# Patient Record
Sex: FEMALE | Race: WHITE | Employment: UNEMPLOYED | URBAN - METROPOLITAN AREA
[De-identification: names, ages, dates, MRNs, and addresses within clinical notes are randomized per-mention and may not be internally consistent; named-entity substitution may affect disease eponyms.]

---

## 2019-03-30 PROBLEM — S82.61XA CLOSED FRACTURE OF DISTAL LATERAL MALLEOLUS OF RIGHT ANKLE: Status: ACTIVE | Noted: 2019-03-30

## 2019-05-23 PROBLEM — R53.1 WEAKNESS: Status: ACTIVE | Noted: 2019-05-23

## 2023-05-03 ENCOUNTER — HOSPITAL ENCOUNTER (EMERGENCY)
Age: 48
Discharge: HOME | End: 2023-05-03
Attending: EMERGENCY MEDICINE
Payer: COMMERCIAL

## 2023-05-03 VITALS
WEIGHT: 239.42 LBS | DIASTOLIC BLOOD PRESSURE: 88 MMHG | TEMPERATURE: 97.7 F | HEART RATE: 72 BPM | SYSTOLIC BLOOD PRESSURE: 131 MMHG | RESPIRATION RATE: 15 BRPM | HEIGHT: 63 IN | BODY MASS INDEX: 42.42 KG/M2 | OXYGEN SATURATION: 99 %

## 2023-05-03 DIAGNOSIS — F43.21 SITUATIONAL DEPRESSION: ICD-10-CM

## 2023-05-03 DIAGNOSIS — E83.42 HYPOMAGNESEMIA: ICD-10-CM

## 2023-05-03 DIAGNOSIS — E87.20 METABOLIC ACIDOSIS: ICD-10-CM

## 2023-05-03 DIAGNOSIS — R79.89 ELEVATED LACTIC ACID LEVEL: ICD-10-CM

## 2023-05-03 DIAGNOSIS — F10.929 ACUTE ALCOHOLIC INTOXICATION WITH COMPLICATION (HCC): Primary | ICD-10-CM

## 2023-05-03 DIAGNOSIS — E87.6 HYPOKALEMIA: ICD-10-CM

## 2023-05-03 LAB
ALBUMIN SERPL-MCNC: 3.8 G/DL (ref 3.4–5)
ALBUMIN/GLOB SERPL: 1.5 {RATIO} (ref 1.1–2.2)
ALP SERPL-CCNC: 64 U/L (ref 40–129)
ALT SERPL-CCNC: 28 U/L (ref 10–40)
ANION GAP SERPL CALCULATED.3IONS-SCNC: 15 MMOL/L (ref 3–16)
AST SERPL-CCNC: 35 U/L (ref 15–37)
BASOPHILS # BLD: 0.3 K/UL (ref 0–0.2)
BASOPHILS NFR BLD: 2.6 %
BILIRUB SERPL-MCNC: <0.2 MG/DL (ref 0–1)
BUN SERPL-MCNC: 4 MG/DL (ref 7–20)
CALCIUM SERPL-MCNC: 7.8 MG/DL (ref 8.3–10.6)
CHLORIDE SERPL-SCNC: 108 MMOL/L (ref 99–110)
CO2 SERPL-SCNC: 17 MMOL/L (ref 21–32)
CREAT SERPL-MCNC: <0.5 MG/DL (ref 0.6–1.1)
DEPRECATED RDW RBC AUTO: 13.8 % (ref 12.4–15.4)
EKG ATRIAL RATE: 82 BPM
EKG DIAGNOSIS: NORMAL
EKG P AXIS: 17 DEGREES
EKG P-R INTERVAL: 188 MS
EKG Q-T INTERVAL: 390 MS
EKG QRS DURATION: 70 MS
EKG QTC CALCULATION (BAZETT): 455 MS
EKG R AXIS: 43 DEGREES
EKG T AXIS: 42 DEGREES
EKG VENTRICULAR RATE: 82 BPM
EOSINOPHIL # BLD: 0.1 K/UL (ref 0–0.6)
EOSINOPHIL NFR BLD: 0.8 %
ETHANOLAMINE SERPL-MCNC: 210 MG/DL (ref 0–0.08)
GFR SERPLBLD CREATININE-BSD FMLA CKD-EPI: >60 ML/MIN/{1.73_M2}
GLUCOSE SERPL-MCNC: 100 MG/DL (ref 70–99)
HCG SERPL QL: NEGATIVE
HCT VFR BLD AUTO: 46.4 % (ref 36–48)
HGB BLD-MCNC: 15.6 G/DL (ref 12–16)
LACTATE BLDV-SCNC: 2.7 MMOL/L (ref 0.4–2)
LACTATE BLDV-SCNC: 3.9 MMOL/L (ref 0.4–2)
LIPASE SERPL-CCNC: 42 U/L (ref 13–60)
LYMPHOCYTES # BLD: 3.2 K/UL (ref 1–5.1)
LYMPHOCYTES NFR BLD: 28.6 %
MAGNESIUM SERPL-MCNC: 1.7 MG/DL (ref 1.8–2.4)
MCH RBC QN AUTO: 30.4 PG (ref 26–34)
MCHC RBC AUTO-ENTMCNC: 33.6 G/DL (ref 31–36)
MCV RBC AUTO: 90.6 FL (ref 80–100)
MONOCYTES # BLD: 0.9 K/UL (ref 0–1.3)
MONOCYTES NFR BLD: 7.6 %
NEUTROPHILS # BLD: 6.8 K/UL (ref 1.7–7.7)
NEUTROPHILS NFR BLD: 60.4 %
PLATELET # BLD AUTO: 171 K/UL (ref 135–450)
PMV BLD AUTO: 11.5 FL (ref 5–10.5)
POTASSIUM SERPL-SCNC: 3.3 MMOL/L (ref 3.5–5.1)
PROT SERPL-MCNC: 6.4 G/DL (ref 6.4–8.2)
RBC # BLD AUTO: 5.12 M/UL (ref 4–5.2)
SODIUM SERPL-SCNC: 140 MMOL/L (ref 136–145)
TROPONIN, HIGH SENSITIVITY: <6 NG/L (ref 0–14)
WBC # BLD AUTO: 11.3 K/UL (ref 4–11)

## 2023-05-03 PROCEDURE — 96375 TX/PRO/DX INJ NEW DRUG ADDON: CPT

## 2023-05-03 PROCEDURE — 93010 ELECTROCARDIOGRAM REPORT: CPT | Performed by: INTERNAL MEDICINE

## 2023-05-03 PROCEDURE — 96365 THER/PROPH/DIAG IV INF INIT: CPT

## 2023-05-03 PROCEDURE — 6360000002 HC RX W HCPCS: Performed by: EMERGENCY MEDICINE

## 2023-05-03 PROCEDURE — 85025 COMPLETE CBC W/AUTO DIFF WBC: CPT

## 2023-05-03 PROCEDURE — 83735 ASSAY OF MAGNESIUM: CPT

## 2023-05-03 PROCEDURE — 99284 EMERGENCY DEPT VISIT MOD MDM: CPT

## 2023-05-03 PROCEDURE — 36415 COLL VENOUS BLD VENIPUNCTURE: CPT

## 2023-05-03 PROCEDURE — 84484 ASSAY OF TROPONIN QUANT: CPT

## 2023-05-03 PROCEDURE — 83690 ASSAY OF LIPASE: CPT

## 2023-05-03 PROCEDURE — 96361 HYDRATE IV INFUSION ADD-ON: CPT

## 2023-05-03 PROCEDURE — 84703 CHORIONIC GONADOTROPIN ASSAY: CPT

## 2023-05-03 PROCEDURE — 2580000003 HC RX 258: Performed by: EMERGENCY MEDICINE

## 2023-05-03 PROCEDURE — 93005 ELECTROCARDIOGRAM TRACING: CPT | Performed by: EMERGENCY MEDICINE

## 2023-05-03 PROCEDURE — 80053 COMPREHEN METABOLIC PANEL: CPT

## 2023-05-03 PROCEDURE — 83605 ASSAY OF LACTIC ACID: CPT

## 2023-05-03 PROCEDURE — 82077 ASSAY SPEC XCP UR&BREATH IA: CPT

## 2023-05-03 RX ORDER — 0.9 % SODIUM CHLORIDE 0.9 %
2000 INTRAVENOUS SOLUTION INTRAVENOUS ONCE
Status: COMPLETED | OUTPATIENT
Start: 2023-05-03 | End: 2023-05-03

## 2023-05-03 RX ORDER — POTASSIUM CHLORIDE 750 MG/1
10 TABLET, EXTENDED RELEASE ORAL DAILY
Qty: 10 TABLET | Refills: 0 | Status: SHIPPED | OUTPATIENT
Start: 2023-05-03 | End: 2023-05-13

## 2023-05-03 RX ORDER — ONDANSETRON 2 MG/ML
8 INJECTION INTRAMUSCULAR; INTRAVENOUS ONCE
Status: COMPLETED | OUTPATIENT
Start: 2023-05-03 | End: 2023-05-03

## 2023-05-03 RX ORDER — MAGNESIUM SULFATE 1 G/100ML
1000 INJECTION INTRAVENOUS ONCE
Status: COMPLETED | OUTPATIENT
Start: 2023-05-03 | End: 2023-05-03

## 2023-05-03 RX ORDER — ONDANSETRON 8 MG/1
8 TABLET, ORALLY DISINTEGRATING ORAL EVERY 8 HOURS PRN
Qty: 12 TABLET | Refills: 0 | Status: SHIPPED | OUTPATIENT
Start: 2023-05-03 | End: 2023-05-07

## 2023-05-03 RX ORDER — POTASSIUM CHLORIDE 7.45 MG/ML
10 INJECTION INTRAVENOUS PRN
Status: DISCONTINUED | OUTPATIENT
Start: 2023-05-03 | End: 2023-05-03 | Stop reason: HOSPADM

## 2023-05-03 RX ORDER — BACLOFEN 20 MG
500 TABLET ORAL 2 TIMES DAILY
Qty: 20 TABLET | Refills: 0 | Status: SHIPPED | OUTPATIENT
Start: 2023-05-03 | End: 2023-05-13

## 2023-05-03 RX ADMIN — SODIUM CHLORIDE 2000 ML: 9 INJECTION, SOLUTION INTRAVENOUS at 05:13

## 2023-05-03 RX ADMIN — ONDANSETRON 8 MG: 2 INJECTION INTRAMUSCULAR; INTRAVENOUS at 05:11

## 2023-05-03 RX ADMIN — POTASSIUM CHLORIDE 10 MEQ: 7.46 INJECTION, SOLUTION INTRAVENOUS at 07:12

## 2023-05-03 RX ADMIN — MAGNESIUM SULFATE HEPTAHYDRATE 1000 MG: 1 INJECTION, SOLUTION INTRAVENOUS at 06:38

## 2023-05-03 ASSESSMENT — PAIN - FUNCTIONAL ASSESSMENT
PAIN_FUNCTIONAL_ASSESSMENT: NONE - DENIES PAIN

## 2023-05-03 NOTE — DISCHARGE INSTRUCTIONS
Please continue your present treatments and medications until you receive further instructions from your healthcare professional. Take all your medications as prescribed unless a healthcare professional instructs you to do otherwise. Stop drinking alcohol. It appears to be dangerous for you.

## 2023-05-03 NOTE — ED PROVIDER NOTES
90 tablet 2    metoprolol succinate (TOPROL XL) 100 MG extended release tablet TAKE ONE TABLET BY MOUTH DAILY 90 tablet 1    TIZANIDINE HCL PO Take 1 tablet by mouth as needed      albuterol sulfate  (90 Base) MCG/ACT inhaler Inhale 2 puffs into the lungs 4 times daily as needed for Wheezing 18 g 0    cetirizine (ZYRTEC ALLERGY) 10 MG tablet Take 1 tablet by mouth daily (Patient taking differently: Take 10 mg by mouth as needed ) 10 tablet 0    diclofenac sodium (VOLTAREN) 1 % GEL Apply 4 g topically 4 times daily 350 g 2    vitamin D (ERGOCALCIFEROL) 1.25 MG (88358 UT) CAPS capsule Take 1 capsule by mouth once a week 4 capsule 5    traZODone (DESYREL) 50 MG tablet TAKE ONE TABLET BY MOUTH DAILY 30 tablet 2    albuterol sulfate HFA (VENTOLIN HFA) 108 (90 Base) MCG/ACT inhaler Inhale 2 puffs into the lungs 4 times daily as needed for Wheezing 1 Inhaler 5    oxyCODONE-acetaminophen (PERCOCET) 5-325 MG per tablet Take 1 tablet by mouth 2 times daily as needed. ALLERGIES  Allergies   Allergen Reactions    Latex     Augmentin [Amoxicillin-Pot Clavulanate] Shortness Of Breath and Swelling    Cefzil [Cefprozil] Rash    Vancomycin Swelling and Palpitations       Tetanus vaccination status reviewed: tetanus re-vaccination not indicated. PHYSICAL EXAM  VITAL SIGNS: BP (!) 129/95   Pulse 88   Temp 97.7 °F (36.5 °C) (Oral)   Resp 22   Ht 5' 2.99\" (1.6 m)   Wt 239 lb 6.7 oz (108.6 kg)   LMP 05/03/2023   SpO2 100%   BMI 42.42 kg/m²   Constitutional: Well-developed, well-nourished, appears intoxicated, awake, vomiting, nontoxic, activity: Appears upset and vomiting. She does not hold her emesis bag herself. She has the nurse doing everything for her this includes wiping her mouth.   HENT: Normocephalic, Atraumatic, Bilateral external ears normal, TM's were normal, Mucus membranes are moist and oropharynx is patent and clear, No oral exudates, Nose normal, No lingual abrasions, no lingual lacerations, no

## 2023-05-03 NOTE — ED TRIAGE NOTES
Pt was brought in by squad with c/o alcohol intoxication. Pt denies pain, pt alert and oriented. Pt c/o being cold. Pt clothes ere wet, had vomit, urine and stool. Clothes removed and warm gown and warm blanket applied. Pt c/o nausea.

## 2023-06-23 ENCOUNTER — HOSPITAL ENCOUNTER (OUTPATIENT)
Dept: MRI IMAGING | Age: 48
Discharge: HOME | End: 2023-06-23
Payer: COMMERCIAL

## 2023-06-23 ENCOUNTER — HOSPITAL ENCOUNTER (OUTPATIENT)
Dept: GENERAL RADIOLOGY | Age: 48
Discharge: HOME | End: 2023-06-23
Payer: COMMERCIAL

## 2023-06-23 ENCOUNTER — HOSPITAL ENCOUNTER (OUTPATIENT)
Age: 48
Discharge: HOME | End: 2023-06-23
Payer: COMMERCIAL

## 2023-06-23 DIAGNOSIS — M46.1 DEGENERATIVE JOINT DISEASE OF SACROILIAC REGION (HCC): ICD-10-CM

## 2023-06-23 PROCEDURE — 73560 X-RAY EXAM OF KNEE 1 OR 2: CPT

## 2023-06-23 PROCEDURE — 72157 MRI CHEST SPINE W/O & W/DYE: CPT

## 2023-06-23 PROCEDURE — A9577 INJ MULTIHANCE: HCPCS | Performed by: PHYSICAL MEDICINE & REHABILITATION

## 2023-06-23 PROCEDURE — 72158 MRI LUMBAR SPINE W/O & W/DYE: CPT

## 2023-06-23 PROCEDURE — 6360000004 HC RX CONTRAST MEDICATION: Performed by: PHYSICAL MEDICINE & REHABILITATION

## 2023-06-23 RX ADMIN — GADOBENATE DIMEGLUMINE 20 ML: 529 INJECTION, SOLUTION INTRAVENOUS at 11:00

## 2024-02-12 ENCOUNTER — HOSPITAL ENCOUNTER (OUTPATIENT)
Dept: ULTRASOUND IMAGING | Age: 49
Discharge: HOME | End: 2024-02-12
Attending: INTERNAL MEDICINE
Payer: COMMERCIAL

## 2024-02-12 ENCOUNTER — HOSPITAL ENCOUNTER (OUTPATIENT)
Age: 49
Discharge: HOME | End: 2024-02-12
Attending: INTERNAL MEDICINE
Payer: COMMERCIAL

## 2024-02-12 ENCOUNTER — HOSPITAL ENCOUNTER (OUTPATIENT)
Dept: GENERAL RADIOLOGY | Age: 49
Discharge: HOME | End: 2024-02-12
Attending: INTERNAL MEDICINE
Payer: COMMERCIAL

## 2024-02-12 DIAGNOSIS — I10 HYPERTENSION, UNSPECIFIED TYPE: ICD-10-CM

## 2024-02-12 DIAGNOSIS — M15.9 OSTEOARTHRITIS OF MULTIPLE JOINTS, UNSPECIFIED OSTEOARTHRITIS TYPE: ICD-10-CM

## 2024-02-12 DIAGNOSIS — R10.30 LOWER ABDOMINAL PAIN: ICD-10-CM

## 2024-02-12 LAB
25(OH)D3 SERPL-MCNC: 16.6 NG/ML
ALBUMIN SERPL-MCNC: 3.6 G/DL (ref 3.4–5)
ALBUMIN/GLOB SERPL: 1.5 {RATIO} (ref 1.1–2.2)
ALP SERPL-CCNC: 65 U/L (ref 40–129)
ALT SERPL-CCNC: 11 U/L (ref 10–40)
ANION GAP SERPL CALCULATED.3IONS-SCNC: 11 MMOL/L (ref 3–16)
AST SERPL-CCNC: 15 U/L (ref 15–37)
BASOPHILS # BLD: 0.2 K/UL (ref 0–0.2)
BASOPHILS NFR BLD: 1.6 %
BILIRUB SERPL-MCNC: 0.3 MG/DL (ref 0–1)
BUN SERPL-MCNC: 8 MG/DL (ref 7–20)
CALCIUM SERPL-MCNC: 8.6 MG/DL (ref 8.3–10.6)
CHLORIDE SERPL-SCNC: 104 MMOL/L (ref 99–110)
CHOLEST SERPL-MCNC: 162 MG/DL (ref 0–199)
CO2 SERPL-SCNC: 23 MMOL/L (ref 21–32)
CREAT SERPL-MCNC: 0.7 MG/DL (ref 0.6–1.1)
DEPRECATED RDW RBC AUTO: 14 % (ref 12.4–15.4)
EOSINOPHIL # BLD: 0.3 K/UL (ref 0–0.6)
EOSINOPHIL NFR BLD: 2.7 %
ERYTHROCYTE [SEDIMENTATION RATE] IN BLOOD BY WESTERGREN METHOD: 7 MM/HR (ref 0–20)
EST. AVERAGE GLUCOSE BLD GHB EST-MCNC: 99.7 MG/DL
FOLATE SERPL-MCNC: 2.93 NG/ML (ref 4.78–24.2)
GFR SERPLBLD CREATININE-BSD FMLA CKD-EPI: >60 ML/MIN/{1.73_M2}
GLUCOSE SERPL-MCNC: 93 MG/DL (ref 70–99)
HBA1C MFR BLD: 5.1 %
HCT VFR BLD AUTO: 44 % (ref 36–48)
HDLC SERPL-MCNC: 48 MG/DL (ref 40–60)
HGB BLD-MCNC: 15 G/DL (ref 12–16)
LDLC SERPL CALC-MCNC: 88 MG/DL
LYMPHOCYTES # BLD: 2.9 K/UL (ref 1–5.1)
LYMPHOCYTES NFR BLD: 28.4 %
MCH RBC QN AUTO: 29.7 PG (ref 26–34)
MCHC RBC AUTO-ENTMCNC: 34.1 G/DL (ref 31–36)
MCV RBC AUTO: 86.9 FL (ref 80–100)
MONOCYTES # BLD: 0.6 K/UL (ref 0–1.3)
MONOCYTES NFR BLD: 6.2 %
NEUTROPHILS # BLD: 6.2 K/UL (ref 1.7–7.7)
NEUTROPHILS NFR BLD: 61.1 %
PLATELET # BLD AUTO: 160 K/UL (ref 135–450)
PLATELET BLD QL SMEAR: ADEQUATE
PMV BLD AUTO: 12.4 FL (ref 5–10.5)
POTASSIUM SERPL-SCNC: 3.3 MMOL/L (ref 3.5–5.1)
PROT SERPL-MCNC: 6 G/DL (ref 6.4–8.2)
RBC # BLD AUTO: 5.06 M/UL (ref 4–5.2)
SLIDE REVIEW: ABNORMAL
SODIUM SERPL-SCNC: 138 MMOL/L (ref 136–145)
TRIGL SERPL-MCNC: 131 MG/DL (ref 0–150)
TSH SERPL DL<=0.005 MIU/L-ACNC: 2.27 UIU/ML (ref 0.27–4.2)
VIT B12 SERPL-MCNC: 434 PG/ML (ref 211–911)
VLDLC SERPL CALC-MCNC: 26 MG/DL
WBC # BLD AUTO: 10.1 K/UL (ref 4–11)

## 2024-02-12 PROCEDURE — 80061 LIPID PANEL: CPT

## 2024-02-12 PROCEDURE — 36415 COLL VENOUS BLD VENIPUNCTURE: CPT

## 2024-02-12 PROCEDURE — 85652 RBC SED RATE AUTOMATED: CPT

## 2024-02-12 PROCEDURE — 72050 X-RAY EXAM NECK SPINE 4/5VWS: CPT

## 2024-02-12 PROCEDURE — 72040 X-RAY EXAM NECK SPINE 2-3 VW: CPT

## 2024-02-12 PROCEDURE — 76705 ECHO EXAM OF ABDOMEN: CPT

## 2024-02-12 PROCEDURE — 82306 VITAMIN D 25 HYDROXY: CPT

## 2024-02-12 PROCEDURE — 83036 HEMOGLOBIN GLYCOSYLATED A1C: CPT

## 2024-02-12 PROCEDURE — 82607 VITAMIN B-12: CPT

## 2024-02-12 PROCEDURE — 80053 COMPREHEN METABOLIC PANEL: CPT

## 2024-02-12 PROCEDURE — 82746 ASSAY OF FOLIC ACID SERUM: CPT

## 2024-02-12 PROCEDURE — 73030 X-RAY EXAM OF SHOULDER: CPT

## 2024-02-12 PROCEDURE — 84443 ASSAY THYROID STIM HORMONE: CPT

## 2024-02-12 PROCEDURE — 85025 COMPLETE CBC W/AUTO DIFF WBC: CPT

## 2024-06-26 ENCOUNTER — HOSPITAL ENCOUNTER (INPATIENT)
Age: 49
LOS: 1 days | Discharge: HOME | DRG: 313 | End: 2024-06-27
Attending: EMERGENCY MEDICINE | Admitting: INTERNAL MEDICINE
Payer: MEDICAID

## 2024-06-26 ENCOUNTER — APPOINTMENT (OUTPATIENT)
Dept: GENERAL RADIOLOGY | Age: 49
DRG: 313 | End: 2024-06-26
Payer: MEDICAID

## 2024-06-26 DIAGNOSIS — R07.9 CHEST PAIN, UNSPECIFIED TYPE: ICD-10-CM

## 2024-06-26 DIAGNOSIS — R07.9 ACUTE CHEST PAIN: Primary | ICD-10-CM

## 2024-06-26 LAB
ALBUMIN SERPL-MCNC: 3.7 G/DL (ref 3.4–5)
ALBUMIN/GLOB SERPL: 1.7 {RATIO} (ref 1.1–2.2)
ALP SERPL-CCNC: 67 U/L (ref 40–129)
ALT SERPL-CCNC: 13 U/L (ref 10–40)
ANION GAP SERPL CALCULATED.3IONS-SCNC: 11 MMOL/L (ref 3–16)
AST SERPL-CCNC: 18 U/L (ref 15–37)
BASOPHILS # BLD: 0.2 K/UL (ref 0–0.2)
BASOPHILS NFR BLD: 1.5 %
BILIRUB SERPL-MCNC: 0.4 MG/DL (ref 0–1)
BUN SERPL-MCNC: 6 MG/DL (ref 7–20)
CALCIUM SERPL-MCNC: 8.7 MG/DL (ref 8.3–10.6)
CHLORIDE SERPL-SCNC: 96 MMOL/L (ref 99–110)
CO2 SERPL-SCNC: 29 MMOL/L (ref 21–32)
CREAT SERPL-MCNC: 0.9 MG/DL (ref 0.6–1.1)
DEPRECATED RDW RBC AUTO: 14.5 % (ref 12.4–15.4)
EOSINOPHIL # BLD: 0.3 K/UL (ref 0–0.6)
EOSINOPHIL NFR BLD: 2.5 %
GFR SERPLBLD CREATININE-BSD FMLA CKD-EPI: 78 ML/MIN/{1.73_M2}
GLUCOSE BLD-MCNC: 113 MG/DL (ref 70–99)
GLUCOSE SERPL-MCNC: 87 MG/DL (ref 70–99)
HCG SERPL QL: NEGATIVE
HCT VFR BLD AUTO: 46.3 % (ref 36–48)
HGB BLD-MCNC: 15.9 G/DL (ref 12–16)
LIPASE SERPL-CCNC: 63 U/L (ref 13–60)
LYMPHOCYTES # BLD: 3.9 K/UL (ref 1–5.1)
LYMPHOCYTES NFR BLD: 32.7 %
MAGNESIUM SERPL-MCNC: 1.8 MG/DL (ref 1.8–2.4)
MCH RBC QN AUTO: 30.7 PG (ref 26–34)
MCHC RBC AUTO-ENTMCNC: 34.5 G/DL (ref 31–36)
MCV RBC AUTO: 89.1 FL (ref 80–100)
MONOCYTES # BLD: 0.6 K/UL (ref 0–1.3)
MONOCYTES NFR BLD: 5.5 %
NEUTROPHILS # BLD: 6.8 K/UL (ref 1.7–7.7)
NEUTROPHILS NFR BLD: 57.8 %
PERFORMED ON: ABNORMAL
PLATELET # BLD AUTO: 168 K/UL (ref 135–450)
PMV BLD AUTO: 10.8 FL (ref 5–10.5)
POTASSIUM SERPL-SCNC: 3.2 MMOL/L (ref 3.5–5.1)
PROT SERPL-MCNC: 5.9 G/DL (ref 6.4–8.2)
RBC # BLD AUTO: 5.19 M/UL (ref 4–5.2)
SODIUM SERPL-SCNC: 136 MMOL/L (ref 136–145)
TROPONIN, HIGH SENSITIVITY: 7 NG/L (ref 0–14)
TROPONIN, HIGH SENSITIVITY: 9 NG/L (ref 0–14)
TROPONIN, HIGH SENSITIVITY: <6 NG/L (ref 0–14)
TROPONIN, HIGH SENSITIVITY: <6 NG/L (ref 0–14)
WBC # BLD AUTO: 11.8 K/UL (ref 4–11)

## 2024-06-26 PROCEDURE — 36415 COLL VENOUS BLD VENIPUNCTURE: CPT

## 2024-06-26 PROCEDURE — 83735 ASSAY OF MAGNESIUM: CPT

## 2024-06-26 PROCEDURE — 84484 ASSAY OF TROPONIN QUANT: CPT

## 2024-06-26 PROCEDURE — 83690 ASSAY OF LIPASE: CPT

## 2024-06-26 PROCEDURE — 99285 EMERGENCY DEPT VISIT HI MDM: CPT

## 2024-06-26 PROCEDURE — 85025 COMPLETE CBC W/AUTO DIFF WBC: CPT

## 2024-06-26 PROCEDURE — 1200000000 HC SEMI PRIVATE

## 2024-06-26 PROCEDURE — 93005 ELECTROCARDIOGRAM TRACING: CPT | Performed by: EMERGENCY MEDICINE

## 2024-06-26 PROCEDURE — 80053 COMPREHEN METABOLIC PANEL: CPT

## 2024-06-26 PROCEDURE — 6370000000 HC RX 637 (ALT 250 FOR IP): Performed by: EMERGENCY MEDICINE

## 2024-06-26 PROCEDURE — 84703 CHORIONIC GONADOTROPIN ASSAY: CPT

## 2024-06-26 PROCEDURE — 6370000000 HC RX 637 (ALT 250 FOR IP): Performed by: INTERNAL MEDICINE

## 2024-06-26 PROCEDURE — 71046 X-RAY EXAM CHEST 2 VIEWS: CPT

## 2024-06-26 RX ORDER — DULOXETIN HYDROCHLORIDE 60 MG/1
60 CAPSULE, DELAYED RELEASE ORAL DAILY
Status: DISCONTINUED | OUTPATIENT
Start: 2024-06-27 | End: 2024-06-27 | Stop reason: HOSPADM

## 2024-06-26 RX ORDER — GLUCAGON 1 MG/ML
1 KIT INJECTION PRN
Status: DISCONTINUED | OUTPATIENT
Start: 2024-06-26 | End: 2024-06-27 | Stop reason: HOSPADM

## 2024-06-26 RX ORDER — FUROSEMIDE 20 MG/1
20 TABLET ORAL 2 TIMES DAILY
Status: DISCONTINUED | OUTPATIENT
Start: 2024-06-27 | End: 2024-06-27 | Stop reason: HOSPADM

## 2024-06-26 RX ORDER — INSULIN LISPRO 100 [IU]/ML
0-4 INJECTION, SOLUTION INTRAVENOUS; SUBCUTANEOUS NIGHTLY
Status: DISCONTINUED | OUTPATIENT
Start: 2024-06-26 | End: 2024-06-27 | Stop reason: HOSPADM

## 2024-06-26 RX ORDER — NICOTINE 21 MG/24HR
1 PATCH, TRANSDERMAL 24 HOURS TRANSDERMAL DAILY
Status: DISCONTINUED | OUTPATIENT
Start: 2024-06-27 | End: 2024-06-27 | Stop reason: HOSPADM

## 2024-06-26 RX ORDER — DEXTROSE MONOHYDRATE 100 MG/ML
INJECTION, SOLUTION INTRAVENOUS CONTINUOUS PRN
Status: DISCONTINUED | OUTPATIENT
Start: 2024-06-26 | End: 2024-06-27 | Stop reason: HOSPADM

## 2024-06-26 RX ORDER — INSULIN LISPRO 100 [IU]/ML
0-4 INJECTION, SOLUTION INTRAVENOUS; SUBCUTANEOUS
Status: DISCONTINUED | OUTPATIENT
Start: 2024-06-27 | End: 2024-06-27 | Stop reason: HOSPADM

## 2024-06-26 RX ORDER — OXYCODONE HYDROCHLORIDE AND ACETAMINOPHEN 5; 325 MG/1; MG/1
1 TABLET ORAL 2 TIMES DAILY PRN
Status: DISCONTINUED | OUTPATIENT
Start: 2024-06-26 | End: 2024-06-27 | Stop reason: HOSPADM

## 2024-06-26 RX ORDER — NIFEDIPINE 60 MG/1
60 TABLET, EXTENDED RELEASE ORAL DAILY
Status: DISCONTINUED | OUTPATIENT
Start: 2024-06-27 | End: 2024-06-27 | Stop reason: HOSPADM

## 2024-06-26 RX ORDER — NITROGLYCERIN 0.4 MG/1
0.4 TABLET SUBLINGUAL EVERY 5 MIN PRN
Status: DISCONTINUED | OUTPATIENT
Start: 2024-06-26 | End: 2024-06-27 | Stop reason: HOSPADM

## 2024-06-26 RX ORDER — ASPIRIN 81 MG/1
324 TABLET, CHEWABLE ORAL ONCE
Status: COMPLETED | OUTPATIENT
Start: 2024-06-26 | End: 2024-06-26

## 2024-06-26 RX ORDER — GABAPENTIN 100 MG/1
400 CAPSULE ORAL 3 TIMES DAILY
Status: ON HOLD | COMMUNITY
End: 2024-06-27

## 2024-06-26 RX ORDER — METOPROLOL SUCCINATE 50 MG/1
100 TABLET, EXTENDED RELEASE ORAL DAILY
Status: DISCONTINUED | OUTPATIENT
Start: 2024-06-27 | End: 2024-06-27 | Stop reason: HOSPADM

## 2024-06-26 RX ORDER — PANTOPRAZOLE SODIUM 40 MG/1
40 TABLET, DELAYED RELEASE ORAL
Status: DISCONTINUED | OUTPATIENT
Start: 2024-06-27 | End: 2024-06-27 | Stop reason: HOSPADM

## 2024-06-26 RX ORDER — ARIPIPRAZOLE 5 MG/1
5 TABLET ORAL DAILY
Status: DISCONTINUED | OUTPATIENT
Start: 2024-06-27 | End: 2024-06-27 | Stop reason: HOSPADM

## 2024-06-26 RX ORDER — ALBUTEROL SULFATE 90 UG/1
2 AEROSOL, METERED RESPIRATORY (INHALATION) 4 TIMES DAILY PRN
Status: DISCONTINUED | OUTPATIENT
Start: 2024-06-26 | End: 2024-06-27 | Stop reason: HOSPADM

## 2024-06-26 RX ADMIN — ASPIRIN 324 MG: 81 TABLET, CHEWABLE ORAL at 13:54

## 2024-06-26 RX ADMIN — NITROGLYCERIN 0.4 MG: 0.4 TABLET, ORALLY DISINTEGRATING SUBLINGUAL at 13:55

## 2024-06-26 RX ADMIN — NITROGLYCERIN 0.4 MG: 0.4 TABLET, ORALLY DISINTEGRATING SUBLINGUAL at 14:04

## 2024-06-26 RX ADMIN — OXYCODONE HYDROCHLORIDE AND ACETAMINOPHEN 1 TABLET: 5; 325 TABLET ORAL at 19:25

## 2024-06-26 ASSESSMENT — PAIN - FUNCTIONAL ASSESSMENT
PAIN_FUNCTIONAL_ASSESSMENT: 0-10
PAIN_FUNCTIONAL_ASSESSMENT: 0-10
PAIN_FUNCTIONAL_ASSESSMENT: ACTIVITIES ARE NOT PREVENTED
PAIN_FUNCTIONAL_ASSESSMENT: 0-10

## 2024-06-26 ASSESSMENT — PAIN DESCRIPTION - FREQUENCY: FREQUENCY: CONTINUOUS

## 2024-06-26 ASSESSMENT — PAIN DESCRIPTION - DESCRIPTORS: DESCRIPTORS: SHARP;ACHING

## 2024-06-26 ASSESSMENT — HEART SCORE: ECG: NORMAL

## 2024-06-26 ASSESSMENT — PAIN DESCRIPTION - LOCATION
LOCATION: CHEST
LOCATION: CHEST

## 2024-06-26 ASSESSMENT — PAIN SCALES - GENERAL
PAINLEVEL_OUTOF10: 5
PAINLEVEL_OUTOF10: 4
PAINLEVEL_OUTOF10: 4
PAINLEVEL_OUTOF10: 6
PAINLEVEL_OUTOF10: 4
PAINLEVEL_OUTOF10: 8

## 2024-06-26 ASSESSMENT — PAIN DESCRIPTION - ORIENTATION: ORIENTATION: MID;RIGHT

## 2024-06-26 ASSESSMENT — PAIN DESCRIPTION - PAIN TYPE: TYPE: ACUTE PAIN

## 2024-06-26 NOTE — ED NOTES
S 48 yo female pt being admitted for chest pain   B Pt has a familial history of Mis and a personal history of HTN & smoking cigarettes  A Troponing and EKG both normal, but pain did improve with nitro   R Plan is to have pt do a stress test in the morning     Two attempts made to call report

## 2024-06-26 NOTE — ED NOTES
Insurance status clarified with registration who reported all information was put in correctly. No insurance available at this time for round trip. [place] Zev Carreno gave permission to book round-trip under [employer]
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
## 2024-06-27 ENCOUNTER — APPOINTMENT (OUTPATIENT)
Dept: NUCLEAR MEDICINE | Age: 49
DRG: 313 | End: 2024-06-27
Payer: MEDICAID

## 2024-06-27 ENCOUNTER — HOSPITAL ENCOUNTER (INPATIENT)
Age: 49
Discharge: HOME | DRG: 313 | End: 2024-06-29
Payer: MEDICAID

## 2024-06-27 VITALS
HEART RATE: 90 BPM | BODY MASS INDEX: 37.56 KG/M2 | SYSTOLIC BLOOD PRESSURE: 115 MMHG | DIASTOLIC BLOOD PRESSURE: 90 MMHG | WEIGHT: 212 LBS | HEIGHT: 63 IN

## 2024-06-27 VITALS
SYSTOLIC BLOOD PRESSURE: 103 MMHG | WEIGHT: 212.52 LBS | HEART RATE: 83 BPM | HEIGHT: 63 IN | TEMPERATURE: 97.8 F | BODY MASS INDEX: 37.66 KG/M2 | OXYGEN SATURATION: 96 % | RESPIRATION RATE: 16 BRPM | DIASTOLIC BLOOD PRESSURE: 59 MMHG

## 2024-06-27 LAB
ALBUMIN SERPL-MCNC: 3.7 G/DL (ref 3.4–5)
ALBUMIN/GLOB SERPL: 1.5 {RATIO} (ref 1.1–2.2)
ALP SERPL-CCNC: 69 U/L (ref 40–129)
ALT SERPL-CCNC: 16 U/L (ref 10–40)
ANION GAP SERPL CALCULATED.3IONS-SCNC: 9 MMOL/L (ref 3–16)
AST SERPL-CCNC: 22 U/L (ref 15–37)
BILIRUB SERPL-MCNC: 0.4 MG/DL (ref 0–1)
BUN SERPL-MCNC: 6 MG/DL (ref 7–20)
CALCIUM SERPL-MCNC: 8.5 MG/DL (ref 8.3–10.6)
CHLORIDE SERPL-SCNC: 98 MMOL/L (ref 99–110)
CO2 SERPL-SCNC: 31 MMOL/L (ref 21–32)
CREAT SERPL-MCNC: 0.8 MG/DL (ref 0.6–1.1)
ECHO BSA: 2.07 M2
EKG ATRIAL RATE: 72 BPM
EKG DIAGNOSIS: NORMAL
EKG P AXIS: 54 DEGREES
EKG P-R INTERVAL: 188 MS
EKG Q-T INTERVAL: 408 MS
EKG QRS DURATION: 76 MS
EKG QTC CALCULATION (BAZETT): 446 MS
EKG R AXIS: 35 DEGREES
EKG T AXIS: 35 DEGREES
EKG VENTRICULAR RATE: 72 BPM
GFR SERPLBLD CREATININE-BSD FMLA CKD-EPI: >90 ML/MIN/{1.73_M2}
GLUCOSE BLD-MCNC: 109 MG/DL (ref 70–99)
GLUCOSE BLD-MCNC: 92 MG/DL (ref 70–99)
GLUCOSE SERPL-MCNC: 97 MG/DL (ref 70–99)
PERFORMED ON: ABNORMAL
PERFORMED ON: NORMAL
POTASSIUM SERPL-SCNC: 3.3 MMOL/L (ref 3.5–5.1)
PROT SERPL-MCNC: 6.1 G/DL (ref 6.4–8.2)
SODIUM SERPL-SCNC: 138 MMOL/L (ref 136–145)
STRESS BASELINE DIAS BP: 90 MMHG
STRESS BASELINE HR: 86 BPM
STRESS BASELINE SYS BP: 115 MMHG
STRESS ESTIMATED WORKLOAD: 5.8 METS
STRESS EXERCISE DUR MIN: 4 MIN
STRESS EXERCISE DUR SEC: 0 SEC
STRESS O2 SAT PEAK: 97 %
STRESS PEAK DIAS BP: 91 MMHG
STRESS PEAK SYS BP: 157 MMHG
STRESS PERCENT HR ACHIEVED: 68 %
STRESS POST PEAK HR: 117 BPM
STRESS RATE PRESSURE PRODUCT: NORMAL BPM*MMHG
STRESS TARGET HR: 171 BPM

## 2024-06-27 PROCEDURE — 80053 COMPREHEN METABOLIC PANEL: CPT

## 2024-06-27 PROCEDURE — 36415 COLL VENOUS BLD VENIPUNCTURE: CPT

## 2024-06-27 PROCEDURE — 93010 ELECTROCARDIOGRAM REPORT: CPT | Performed by: INTERNAL MEDICINE

## 2024-06-27 PROCEDURE — 93017 CV STRESS TEST TRACING ONLY: CPT

## 2024-06-27 PROCEDURE — 78452 HT MUSCLE IMAGE SPECT MULT: CPT

## 2024-06-27 PROCEDURE — 3430000000 HC RX DIAGNOSTIC RADIOPHARMACEUTICAL: Performed by: INTERNAL MEDICINE

## 2024-06-27 PROCEDURE — 6360000002 HC RX W HCPCS: Performed by: INTERNAL MEDICINE

## 2024-06-27 PROCEDURE — A9502 TC99M TETROFOSMIN: HCPCS | Performed by: INTERNAL MEDICINE

## 2024-06-27 PROCEDURE — 94760 N-INVAS EAR/PLS OXIMETRY 1: CPT

## 2024-06-27 PROCEDURE — 6370000000 HC RX 637 (ALT 250 FOR IP): Performed by: INTERNAL MEDICINE

## 2024-06-27 RX ORDER — REGADENOSON 0.08 MG/ML
0.4 INJECTION, SOLUTION INTRAVENOUS
Status: COMPLETED | OUTPATIENT
Start: 2024-06-27 | End: 2024-06-27

## 2024-06-27 RX ADMIN — PANTOPRAZOLE SODIUM 40 MG: 40 TABLET, DELAYED RELEASE ORAL at 06:13

## 2024-06-27 RX ADMIN — ARIPIPRAZOLE 5 MG: 5 TABLET ORAL at 11:23

## 2024-06-27 RX ADMIN — DULOXETINE HYDROCHLORIDE 60 MG: 60 CAPSULE, DELAYED RELEASE ORAL at 11:23

## 2024-06-27 RX ADMIN — POTASSIUM BICARBONATE 40 MEQ: 782 TABLET, EFFERVESCENT ORAL at 11:23

## 2024-06-27 RX ADMIN — METOPROLOL SUCCINATE 100 MG: 50 TABLET, EXTENDED RELEASE ORAL at 11:23

## 2024-06-27 RX ADMIN — FUROSEMIDE 20 MG: 20 TABLET ORAL at 11:23

## 2024-06-27 RX ADMIN — REGADENOSON 0.4 MG: 0.08 INJECTION, SOLUTION INTRAVENOUS at 08:45

## 2024-06-27 RX ADMIN — NIFEDIPINE 60 MG: 60 TABLET, EXTENDED RELEASE ORAL at 11:23

## 2024-06-27 RX ADMIN — METFORMIN HYDROCHLORIDE 500 MG: 500 TABLET ORAL at 11:23

## 2024-06-27 RX ADMIN — TETROFOSMIN 14.3 MILLICURIE: 1.38 INJECTION, POWDER, LYOPHILIZED, FOR SOLUTION INTRAVENOUS at 07:24

## 2024-06-27 RX ADMIN — TETROFOSMIN 33 MILLICURIE: 1.38 INJECTION, POWDER, LYOPHILIZED, FOR SOLUTION INTRAVENOUS at 08:49

## 2024-06-27 NOTE — PLAN OF CARE
Problem: Discharge Planning  Goal: Discharge to home or other facility with appropriate resources  6/27/2024 1515 by Annabelle Mueller RN  Outcome: Completed  6/27/2024 0250 by Julissa Garcia RN  Outcome: Progressing     Problem: Pain  Goal: Verbalizes/displays adequate comfort level or baseline comfort level  6/27/2024 1515 by Annabelle Mueller RN  Outcome: Completed  6/27/2024 0250 by Julissa Garcia RN  Outcome: Progressing     Problem: ABCDS Injury Assessment  Goal: Absence of physical injury  6/27/2024 1515 by Annabelle Mueller RN  Outcome: Completed  6/27/2024 0250 by Julissa Garcia RN  Outcome: Progressing     Problem: Safety - Adult  Goal: Free from fall injury  6/27/2024 1515 by Annabelle Mueller RN  Outcome: Completed  6/27/2024 0250 by Julissa Garcia RN  Outcome: Progressing

## 2024-06-27 NOTE — PROGRESS NOTES
Patient arrived from [place] ED for c/o chest pain. Patient alert and orient, VS and head to toes assessment completed. Patient on portable monitor.patient oriented to call light and room.no skin issue and in bed resting. Will continue to monitor.
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
## 2024-06-27 NOTE — CARE COORDINATION
Discharge Planning:      (CM) reviewed the patient's chart to assess needs. Patient's Readmission Risk Score is 9%  . Patient's medical insurance has been follow up with financial counselor.  Patient's PCP is Mei Delvalle MD .  No needs anticipated, at this time. CM team to follow. Staff to inform CM if additional discharge needs arise.    Pts preferred pharmacy is   [id number] - [place], [place] - [address] - P [phone] - F [phone]  [address] [address]  Phone: [phone] Fax: [phone]    Electronically signed by Katherine Kilgore RN on 6/27/2024 at 4:51 PM  [phone]

## 2024-06-27 NOTE — PROGRESS NOTES
4 Eyes Skin Assessment     NAME:  Mayra Edwards  YOB: 1975  MEDICAL RECORD NUMBER:  [id number]    The patient is being assessed for  Admission    I agree that at least one RN has performed a thorough Head to Toe Skin Assessment on the patient. ALL assessment sites listed below have been assessed.      Areas assessed by both nurses:    Head, Face, Ears, Arms, Elbows, Hands, Sacrum. Buttock, Coccyx, Ischium, and Legs. Feet and Heels        Does the Patient have a Wound? No noted wound(s)       Paulo Prevention initiated by RN: No  Wound Care Orders initiated by RN: No    Pressure Injury (Stage 3,4, Unstageable, DTI, NWPT, and Complex wounds) if present, place Wound referral order by RN under : No    New Ostomies, if present place, Ostomy referral order under : No     Nurse 1 eSignature: Electronically signed by Radha Costa RN on 6/27/24 at 3:54 AM EDT    **SHARE this note so that the co-signing nurse can place an eSignature**    Nurse 2 eSignature: Electronically signed by Jasmina Peters RN on 6/27/24 at 3:55 AM EDT

## 2024-08-23 ENCOUNTER — APPOINTMENT (OUTPATIENT)
Dept: GENERAL RADIOLOGY | Age: 49
End: 2024-08-23
Payer: COMMERCIAL

## 2024-08-23 ENCOUNTER — HOSPITAL ENCOUNTER (EMERGENCY)
Age: 49
Discharge: HOME | End: 2024-08-23
Payer: COMMERCIAL

## 2024-08-23 VITALS
TEMPERATURE: 98.2 F | DIASTOLIC BLOOD PRESSURE: 87 MMHG | SYSTOLIC BLOOD PRESSURE: 147 MMHG | BODY MASS INDEX: 38.28 KG/M2 | RESPIRATION RATE: 16 BRPM | HEIGHT: 63 IN | HEART RATE: 99 BPM | WEIGHT: 216.05 LBS | OXYGEN SATURATION: 98 %

## 2024-08-23 DIAGNOSIS — M25.571 RIGHT ANKLE PAIN, UNSPECIFIED CHRONICITY: Primary | ICD-10-CM

## 2024-08-23 PROCEDURE — 6370000000 HC RX 637 (ALT 250 FOR IP): Performed by: PHYSICIAN ASSISTANT

## 2024-08-23 PROCEDURE — 73630 X-RAY EXAM OF FOOT: CPT

## 2024-08-23 PROCEDURE — 73610 X-RAY EXAM OF ANKLE: CPT

## 2024-08-23 PROCEDURE — 99283 EMERGENCY DEPT VISIT LOW MDM: CPT

## 2024-08-23 RX ORDER — ACETAMINOPHEN 500 MG
1000 TABLET ORAL
Qty: 30 TABLET | Refills: 0 | Status: SHIPPED | OUTPATIENT
Start: 2024-08-23

## 2024-08-23 RX ORDER — IBUPROFEN 600 MG/1
600 TABLET, FILM COATED ORAL ONCE
Status: COMPLETED | OUTPATIENT
Start: 2024-08-23 | End: 2024-08-23

## 2024-08-23 RX ORDER — IBUPROFEN 600 MG/1
600 TABLET, FILM COATED ORAL
Qty: 40 TABLET | Refills: 0 | Status: SHIPPED | OUTPATIENT
Start: 2024-08-23

## 2024-08-23 RX ORDER — GABAPENTIN 400 MG/1
400 CAPSULE ORAL 3 TIMES DAILY
COMMUNITY

## 2024-08-23 RX ADMIN — IBUPROFEN 600 MG: 600 TABLET, FILM COATED ORAL at 19:07

## 2024-08-23 ASSESSMENT — PAIN SCALES - GENERAL: PAINLEVEL_OUTOF10: 4
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
## 2024-08-23 NOTE — DISCHARGE INSTRUCTIONS
X-ray of foot and ankle were negative.  I would like you to follow-up with your podiatrist.  Contact Monday for an appointment on Monday or Tuesday of next week.  I have sent medication to your local pharmacy.  Rest the ankle.  Apply ice/heat whichever seems to make it feel better.  Ace wrap applied.

## 2024-08-23 NOTE — ED PROVIDER NOTES
[facility]  EMERGENCY DEPARTMENT ENCOUNTER        Pt Name: Mayra Edwards  MRN: [id number]  Birthdate 1975  Date of evaluation: 2024  Provider: Julio Montelongo PA-C  PCP: Mei Delvalle MD  Note Started: 5:17 PM EDT 24      JUSTIN. I have evaluated this patient.        CHIEF COMPLAINT       Chief Complaint   Patient presents with    Ankle Pain    Foot Pain     Pt reports having pain and swelling right ankle and pain right foot since  evening.  No known injury.  Hx of fx to right ankle 2019-was in walking boot.   Pain at 4 at rest.   Ice pack given.    RLE with natural skin color and sensation.  Palpable distal pulses to RLE. No OTC pain meds taken.       HISTORY OF PRESENT ILLNESS: 1 or more Elements     History From: Patient    Mayra Edwards is a 49 y.o. female who presents to the Emergency Department with complaint right foot and ankle pain.  Mostly about the ankle circumferentially.  Some minor swelling.  No trauma.  Onset 48 hours ago with progression.  Worse with weightbearing.  Not necessarily worse with getting up from chair or first thing in the morning to suggest plantar fasciitis.  No history of gout.  No recent fevers or chills.  Not septic.  She does report earlier days that she would do ballet and tap.  This could be a slow progressive arthritic situation involving the talotibial joint.  There is no overlying warmth or erythema.  The skin is normal temperature with comparison right and left.    Nursing Notes were all reviewed and agreed with or any disagreements were addressed in the HPI.    REVIEW OF SYSTEMS :      Review of Systems    Positives and Pertinent negatives as per HPI.     SURGICAL HISTORY     Past Surgical History:   Procedure Laterality Date     SECTION      x2    CHOLECYSTECTOMY      DILATION AND CURETTAGE OF UTERUS      ENDOMETRIAL ABLATION      TUBAL LIGATION         CURRENTMEDICATIONS        Previous Medications    ALBUTEROL SULFATE HFA (VENTOLIN HFA) 108 (90 BASE) MCG/ACT INHALER    Inhale 2 puffs into the lungs 4 times daily as needed for Wheezing    ARIPIPRAZOLE (ABILIFY) 5 MG TABLET    Take 1 tablet by mouth daily    DULOXETINE (CYMBALTA) 60 MG EXTENDED RELEASE CAPSULE    Take 1 capsule by mouth daily    FOLIC ACID (FOLVITE) 1 MG TABLET    Take 1 tablet by mouth daily    GABAPENTIN (NEURONTIN) 400 MG CAPSULE    Take 1 capsule by mouth 3 times daily.    HYDROCHLOROTHIAZIDE (HYDRODIURIL) 25 MG TABLET    Take 1 tablet by mouth every morning    METFORMIN (GLUCOPHAGE) 500 MG TABLET    Take 1 tablet by mouth 2 times daily (with meals)    METOPROLOL SUCCINATE (TOPROL XL) 100 MG EXTENDED RELEASE TABLET    Take 1 tablet by mouth daily    NIFEDIPINE (NIFEDICAL XL) 60 MG EXTENDED RELEASE TABLET    Take 1 tablet by mouth daily    OXYCODONE-ACETAMINOPHEN (PERCOCET) 7.5-325 MG PER TABLET    Take 1 tablet by mouth 2 times daily as needed for Pain.    VITAMIN D (ERGOCALCIFEROL) 1.25 MG (47513 UT) CAPS CAPSULE    Take 1 capsule by mouth once a week    VITAMIN MIXTURE (VITAMIN E COMPLETE PO)    Take by mouth Daily       ALLERGIES     Latex, Augmentin [amoxicillin-pot clavulanate], Cefzil [cefprozil], and Vancomycin    FAMILYHISTORY       Family History   Problem Relation Age of Onset    High Blood Pressure Mother     Cancer Mother     Heart Disease Mother     High Blood Pressure Father     Sleep Apnea Father     Heart Disease Father         SOCIAL HISTORY       Social History     Tobacco Use    Smoking status: Every Day     Current packs/day: 1.00     Types: Cigarettes    Smokeless tobacco: Never   Vaping Use    Vaping status: Never Used   Substance Use Topics    Alcohol use: Not Currently    Drug use: No       SCREENINGS          Ana Paula Coma Scale  Eye Opening: Spontaneous  Best Verbal Response: Oriented  Best Motor Response: Obeys commands  Ana Paula Coma Scale Score: 15                        CIWA  MEDICATIONS:  New Prescriptions    ACETAMINOPHEN (TYLENOL) 500 MG TABLET    Take 2 tablets by mouth 3 times daily (with meals)    IBUPROFEN (ADVIL;MOTRIN) 600 MG TABLET    Take 1 tablet by mouth 3 times daily (with meals)       DISCONTINUED MEDICATIONS:  Discontinued Medications    GABAPENTIN (NEURONTIN) 600 MG TABLET    Take 1 tablet by mouth 3 times daily for 30 days.              (Please note that portions of this note were completed with a voice recognition program.  Efforts were made to edit the dictations but occasionally words are mis-transcribed.)    Julio Montelongo PA-C (electronically signed)        Julio Montelongo PA-C  08/23/24 1449

## 2024-08-24 NOTE — ED NOTES
PA-C to bedside to review xrays.   4 inch ace wrap to ankle/foot.   Pt asking for pain med before leaving. Pain at 4.

## 2024-08-24 NOTE — ED NOTES
Ready to go home.   Pain at 4.   Ace wrap on.  Discharge instructions with pt.  Encouraged follow up with podiatrist as recommended and to return to ED as needed.  Pt doesn't think she needs crutches.  when she was going to leave, pt using a walking stick from a [facility] costume as a cane.  Encouraged pt to not do this.  Offered crutches.  Pt thinks she will try standard walker or 4 prong cane that she has at home from a family member.  Assisted pt to wheelchair and then wheeled pt out to car.

## 2024-10-13 SDOH — HEALTH STABILITY: PHYSICAL HEALTH: ON AVERAGE, HOW MANY MINUTES DO YOU ENGAGE IN EXERCISE AT THIS LEVEL?: 60 MIN

## 2024-10-13 SDOH — HEALTH STABILITY: PHYSICAL HEALTH: ON AVERAGE, HOW MANY DAYS PER WEEK DO YOU ENGAGE IN MODERATE TO STRENUOUS EXERCISE (LIKE A BRISK WALK)?: 2 DAYS

## 2024-10-16 ENCOUNTER — OFFICE VISIT (OUTPATIENT)
Dept: ORTHOPEDIC SURGERY | Age: 49
End: 2024-10-16
Payer: COMMERCIAL

## 2024-10-16 VITALS — BODY MASS INDEX: 34.15 KG/M2 | WEIGHT: 200 LBS | HEIGHT: 64 IN

## 2024-10-16 DIAGNOSIS — F17.200 CURRENT SMOKER: ICD-10-CM

## 2024-10-16 DIAGNOSIS — R22.42 LEG MASS, LEFT: Primary | ICD-10-CM

## 2024-10-16 DIAGNOSIS — M79.662 PAIN OF LEFT LOWER LEG: ICD-10-CM

## 2024-10-16 PROCEDURE — G8484 FLU IMMUNIZE NO ADMIN: HCPCS | Performed by: ORTHOPAEDIC SURGERY

## 2024-10-16 PROCEDURE — 99243 OFF/OP CNSLTJ NEW/EST LOW 30: CPT | Performed by: ORTHOPAEDIC SURGERY

## 2024-10-16 PROCEDURE — G8417 CALC BMI ABV UP PARAM F/U: HCPCS | Performed by: ORTHOPAEDIC SURGERY

## 2024-10-16 PROCEDURE — G8427 DOCREV CUR MEDS BY ELIG CLIN: HCPCS | Performed by: ORTHOPAEDIC SURGERY
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
